# Patient Record
Sex: FEMALE | URBAN - METROPOLITAN AREA
[De-identification: names, ages, dates, MRNs, and addresses within clinical notes are randomized per-mention and may not be internally consistent; named-entity substitution may affect disease eponyms.]

---

## 2018-03-05 ENCOUNTER — APPOINTMENT (RX ONLY)
Dept: URBAN - METROPOLITAN AREA CLINIC 52 | Facility: CLINIC | Age: 73
Setting detail: DERMATOLOGY
End: 2018-03-05

## 2018-03-05 DIAGNOSIS — L82.1 OTHER SEBORRHEIC KERATOSIS: ICD-10-CM

## 2018-03-05 DIAGNOSIS — D22 MELANOCYTIC NEVI: ICD-10-CM

## 2018-03-05 DIAGNOSIS — L43.8 OTHER LICHEN PLANUS: ICD-10-CM

## 2018-03-05 DIAGNOSIS — L98.8 OTHER SPECIFIED DISORDERS OF THE SKIN AND SUBCUTANEOUS TISSUE: ICD-10-CM

## 2018-03-05 DIAGNOSIS — D18.0 HEMANGIOMA: ICD-10-CM

## 2018-03-05 DIAGNOSIS — L08.9 LOCAL INFECTION OF THE SKIN AND SUBCUTANEOUS TISSUE, UNSPECIFIED: ICD-10-CM

## 2018-03-05 DIAGNOSIS — L57.8 OTHER SKIN CHANGES DUE TO CHRONIC EXPOSURE TO NONIONIZING RADIATION: ICD-10-CM

## 2018-03-05 PROBLEM — E78.5 HYPERLIPIDEMIA, UNSPECIFIED: Status: ACTIVE | Noted: 2018-03-05

## 2018-03-05 PROBLEM — I10 ESSENTIAL (PRIMARY) HYPERTENSION: Status: ACTIVE | Noted: 2018-03-05

## 2018-03-05 PROBLEM — L70.0 ACNE VULGARIS: Status: ACTIVE | Noted: 2018-03-05

## 2018-03-05 PROBLEM — L40.0 PSORIASIS VULGARIS: Status: ACTIVE | Noted: 2018-03-05

## 2018-03-05 PROBLEM — D18.01 HEMANGIOMA OF SKIN AND SUBCUTANEOUS TISSUE: Status: ACTIVE | Noted: 2018-03-05

## 2018-03-05 PROBLEM — D22.5 MELANOCYTIC NEVI OF TRUNK: Status: ACTIVE | Noted: 2018-03-05

## 2018-03-05 PROCEDURE — ? TREATMENT REGIMEN

## 2018-03-05 PROCEDURE — ? COUNSELING

## 2018-03-05 PROCEDURE — 99203 OFFICE O/P NEW LOW 30 MIN: CPT

## 2018-03-05 PROCEDURE — ? ORDER TESTS

## 2018-03-05 PROCEDURE — ? PRESCRIPTION

## 2018-03-05 RX ORDER — MUPIROCIN 20 MG/G
OINTMENT TOPICAL
Qty: 1 | Refills: 0 | Status: ERX | COMMUNITY
Start: 2018-03-05

## 2018-03-05 RX ADMIN — MUPIROCIN: 20 OINTMENT TOPICAL at 00:00

## 2018-03-05 ASSESSMENT — LOCATION SIMPLE DESCRIPTION DERM
LOCATION SIMPLE: LEFT BUCCAL MUCOSA
LOCATION SIMPLE: LEFT UPPER BACK
LOCATION SIMPLE: RIGHT LIP
LOCATION SIMPLE: LEFT INFERIOR MUCOSAL LIP
LOCATION SIMPLE: RIGHT BUCCAL MUCOSA
LOCATION SIMPLE: LEFT FOREHEAD
LOCATION SIMPLE: LEFT LIP
LOCATION SIMPLE: ABDOMEN

## 2018-03-05 ASSESSMENT — LOCATION ZONE DERM
LOCATION ZONE: MUCOUS_MEMBRANE
LOCATION ZONE: FACE
LOCATION ZONE: TRUNK
LOCATION ZONE: LIP

## 2018-03-05 ASSESSMENT — LOCATION DETAILED DESCRIPTION DERM
LOCATION DETAILED: RIGHT BUCCAL MUCOSA
LOCATION DETAILED: LEFT MEDIAL FOREHEAD
LOCATION DETAILED: LEFT SUPERIOR UPPER BACK
LOCATION DETAILED: LEFT INFERIOR VERMILION LIP
LOCATION DETAILED: LEFT INFERIOR UPPER BACK
LOCATION DETAILED: LEFT BUCCAL MUCOSA
LOCATION DETAILED: LEFT INFERIOR MUCOSAL LIP
LOCATION DETAILED: SUBXIPHOID
LOCATION DETAILED: RIGHT INFERIOR VERMILION LIP

## 2018-03-05 NOTE — HPI: FREE FORM (FOLLOW UP HISTORY)
What Brings You In Today For Follow Up? (This Is An Xx Year Old Patient Who Is Following Up For:): History of Lichen Planus diagnosed approximately 1 year
Where On Your Body Is It? (Located On:): Oral
What Was It Treated With? (The Condition Was Treated With:): Triamcinolone cream\\nFirst prescribed by Shiras oral surgeon \\nThen refill by her local dentist

## 2018-03-05 NOTE — PROCEDURE: TREATMENT REGIMEN
Otc Regimen: Curcumin 500 mg 1 PO BID, lip balm for the lip
Detail Level: Zone
Continue Regimen: Triamcinolone Acetonide dental paste BID 10/5.  ( patient has medication) apply to lip and hold on with non-stick telfa/gauze
Plan: If condition becomes worse will refer to Dr. Jhonny Null

## 2018-03-05 NOTE — PROCEDURE: ORDER TESTS
Expected Date Of Service: 03/05/2018
Billing Type: [employer]
Bill For Surgical Tray: no
Performing Laboratory: [phone]

## 2018-03-27 ENCOUNTER — ESTABLISHED COMPREHENSIVE EXAM (OUTPATIENT)
Dept: URBAN - METROPOLITAN AREA CLINIC 36 | Facility: CLINIC | Age: 73
End: 2018-03-27

## 2018-03-27 DIAGNOSIS — H04.123: ICD-10-CM

## 2018-03-27 DIAGNOSIS — H26.491: ICD-10-CM

## 2018-03-27 DIAGNOSIS — G45.3: ICD-10-CM

## 2018-03-27 DIAGNOSIS — H35.371: ICD-10-CM

## 2018-03-27 PROCEDURE — 1036F TOBACCO NON-USER: CPT

## 2018-03-27 PROCEDURE — 92015 DETERMINE REFRACTIVE STATE: CPT

## 2018-03-27 PROCEDURE — G8427 DOCREV CUR MEDS BY ELIG CLIN: HCPCS

## 2018-03-27 PROCEDURE — 92014 COMPRE OPH EXAM EST PT 1/>: CPT

## 2018-03-27 ASSESSMENT — VISUAL ACUITY
OD_SC: >J10
OD_CC: J1
OS_CC: J1
OD_CC: 20/30
OS_SC: J1+
OU_CC: J1+
OS_SC: 20/80
OD_SC: 20/50-2
OS_CC: 20/30-2

## 2018-03-27 ASSESSMENT — TONOMETRY
OD_IOP_MMHG: 12
OS_IOP_MMHG: 10

## 2018-11-29 ENCOUNTER — ESTABLISHED PATIENT (OUTPATIENT)
Dept: URBAN - METROPOLITAN AREA CLINIC 36 | Facility: CLINIC | Age: 73
End: 2018-11-29

## 2018-11-29 DIAGNOSIS — H26.491: ICD-10-CM

## 2018-11-29 DIAGNOSIS — H35.371: ICD-10-CM

## 2018-11-29 PROCEDURE — 1036F TOBACCO NON-USER: CPT

## 2018-11-29 PROCEDURE — G8427 DOCREV CUR MEDS BY ELIG CLIN: HCPCS

## 2018-11-29 PROCEDURE — 92134 CPTRZ OPH DX IMG PST SGM RTA: CPT

## 2018-11-29 PROCEDURE — 92014 COMPRE OPH EXAM EST PT 1/>: CPT

## 2018-11-29 PROCEDURE — G9903 PT SCRN TBCO ID AS NON USER: HCPCS

## 2018-11-29 ASSESSMENT — VISUAL ACUITY
OS_CC: 20/20-2
OD_CC: 20/40

## 2018-11-29 ASSESSMENT — TONOMETRY
OD_IOP_MMHG: 16
OS_IOP_MMHG: 16

## 2018-12-14 ASSESSMENT — VISUAL ACUITY
OD_CC: 20/40
OS_CC: 20/20
OD_BAT: 20/50 W/ MR

## 2018-12-17 ENCOUNTER — CONSULT (OUTPATIENT)
Dept: URBAN - METROPOLITAN AREA CLINIC 39 | Facility: CLINIC | Age: 73
End: 2018-12-17

## 2018-12-17 ENCOUNTER — SURGERY/PROCEDURE (OUTPATIENT)
Dept: URBAN - METROPOLITAN AREA SURGERY 14 | Facility: SURGERY | Age: 73
End: 2018-12-17

## 2018-12-17 DIAGNOSIS — H26.491: ICD-10-CM

## 2018-12-17 PROCEDURE — 92014 COMPRE OPH EXAM EST PT 1/>: CPT

## 2018-12-17 PROCEDURE — 66821 AFTER CATARACT LASER SURGERY: CPT

## 2018-12-17 PROCEDURE — G9903 PT SCRN TBCO ID AS NON USER: HCPCS

## 2018-12-17 PROCEDURE — G8756 NO BP MEASURE DOC: HCPCS

## 2018-12-17 PROCEDURE — 1036F TOBACCO NON-USER: CPT

## 2018-12-17 PROCEDURE — G8427 DOCREV CUR MEDS BY ELIG CLIN: HCPCS

## 2018-12-17 ASSESSMENT — TONOMETRY
OS_IOP_MMHG: 16
OD_IOP_MMHG: 16

## 2018-12-20 ENCOUNTER — YAG POST-OP (OUTPATIENT)
Dept: URBAN - METROPOLITAN AREA CLINIC 36 | Facility: CLINIC | Age: 73
End: 2018-12-20

## 2018-12-20 DIAGNOSIS — Z98.890: ICD-10-CM

## 2018-12-20 PROCEDURE — 99024 POSTOP FOLLOW-UP VISIT: CPT

## 2018-12-20 ASSESSMENT — VISUAL ACUITY
OS_SC: J1
OD_SC: <J10
OD_SC: 20/50+2
OS_CC: 20/25-2
OD_CC: J1
OS_CC: J2
OS_SC: 20/70-1
OD_CC: 20/30+2

## 2018-12-20 ASSESSMENT — TONOMETRY
OS_IOP_MMHG: 16
OD_IOP_MMHG: 16

## 2019-12-04 ENCOUNTER — ESTABLISHED COMPREHENSIVE EXAM (OUTPATIENT)
Dept: URBAN - METROPOLITAN AREA CLINIC 36 | Facility: CLINIC | Age: 74
End: 2019-12-04

## 2019-12-04 DIAGNOSIS — G43.B0: ICD-10-CM

## 2019-12-04 DIAGNOSIS — H35.371: ICD-10-CM

## 2019-12-04 DIAGNOSIS — H04.123: ICD-10-CM

## 2019-12-04 PROCEDURE — 92134 CPTRZ OPH DX IMG PST SGM RTA: CPT

## 2019-12-04 PROCEDURE — 92015 DETERMINE REFRACTIVE STATE: CPT

## 2019-12-04 PROCEDURE — 92014 COMPRE OPH EXAM EST PT 1/>: CPT

## 2019-12-04 ASSESSMENT — VISUAL ACUITY
OD_CC: 20/25
OD_SC: J10
OS_SC: J1+
OD_CC: J1+
OD_SC: 20/50
OS_SC: 20/70-2
OS_CC: 20/30
OS_CC: J1

## 2019-12-04 ASSESSMENT — TONOMETRY
OD_IOP_MMHG: 15
OS_IOP_MMHG: 16

## 2021-03-18 NOTE — PATIENT DISCUSSION
Patient advised of the right to post-operative care by the surgeon. Patient is fully informed of, and agreed to, co-management with their primary optometric physician. Post-operative care by the surgeon is not medically necessary and co-management is clinically appropriate. Patient has received itemization of fees related to cataract surgery. Transfer of care letter completed for the patient. Transfer care of right eye to Dr. Robert Maxwell on 3.19.2021. Patient instructed to call immediately if any new distortion, blurring, decreased vision or eye pain.

## 2021-03-25 NOTE — PATIENT DISCUSSION
Patient advised of the right to post-operative care by the surgeon. Patient is fully informed of, and agreed to, co-management with their primary optometric physician. Post-operative care by the surgeon is not medically necessary and co-management is clinically appropriate. Patient has received itemization of fees related to cataract surgery. Transfer of care letter completed for the patient. Transfer care of left eye to Dr. Farley Mail on 3.26.2021. Patient instructed to call immediately if any new distortion, blurring, decreased vision or eye pain.

## 2021-03-25 NOTE — PATIENT DISCUSSION
Patient advised of the right to post-operative care by the surgeon. Patient is fully informed of, and agreed to, co-management with their primary optometric physician. Post-operative care by the surgeon is not medically necessary and co-management is clinically appropriate. Patient has received itemization of fees related to cataract surgery. Transfer of care letter completed for the patient. Transfer care of left eye to Dr. Charlotte Holbrook on 3.26.2021. Patient instructed to call immediately if any new distortion, blurring, decreased vision or eye pain.

## 2021-03-25 NOTE — PATIENT DISCUSSION
Cataract surgery has been performed in the first eye and activities of daily living are still impaired. The patient would like to proceed with cataract surgery in the second eye as scheduled. The patient elects BASIC OS, goal of VITA.

## 2021-03-26 ENCOUNTER — ESTABLISHED COMPREHENSIVE EXAM (OUTPATIENT)
Dept: URBAN - METROPOLITAN AREA CLINIC 36 | Facility: CLINIC | Age: 76
End: 2021-03-26

## 2021-03-26 DIAGNOSIS — H35.371: ICD-10-CM

## 2021-03-26 DIAGNOSIS — G43.B0: ICD-10-CM

## 2021-03-26 DIAGNOSIS — H04.123: ICD-10-CM

## 2021-03-26 PROCEDURE — 92014 COMPRE OPH EXAM EST PT 1/>: CPT

## 2021-03-26 PROCEDURE — 92134 CPTRZ OPH DX IMG PST SGM RTA: CPT

## 2021-03-26 ASSESSMENT — VISUAL ACUITY
OS_CC: J1+
OS_SC: J3
OD_SC: 20/60-1
OD_CC: 20/30-2
OD_CC: J3
OS_SC: 20/70+2
OS_CC: 20/25
OD_SC: >J10

## 2021-03-26 ASSESSMENT — TONOMETRY
OS_IOP_MMHG: 16
OD_IOP_MMHG: 16

## 2022-02-21 NOTE — PATIENT DISCUSSION
Patient advised of the right to post-operative care by the surgeon. Patient is fully informed of, and agreed to, co-management with their primary optometric physician. Post-operative care by the surgeon is not medically necessary and co-management is clinically appropriate. Patient has received itemization of fees related to cataract surgery. Transfer of care letter completed for the patient. Transfer care of left eye to Dr. Jomar Flores on 3.26.2021. Patient instructed to call immediately if any new distortion, blurring, decreased vision or eye pain.

## 2022-03-25 ENCOUNTER — ESTABLISHED PATIENT (OUTPATIENT)
Dept: URBAN - METROPOLITAN AREA CLINIC 36 | Facility: CLINIC | Age: 77
End: 2022-03-25

## 2022-03-25 DIAGNOSIS — G43.B0: ICD-10-CM

## 2022-03-25 DIAGNOSIS — H02.831: ICD-10-CM

## 2022-03-25 DIAGNOSIS — H02.834: ICD-10-CM

## 2022-03-25 DIAGNOSIS — H35.371: ICD-10-CM

## 2022-03-25 PROCEDURE — 92014 COMPRE OPH EXAM EST PT 1/>: CPT

## 2022-03-25 ASSESSMENT — VISUAL ACUITY
OD_PH: 20/50+2
OD_SC: 20/40-1
OS_CC: J1+
OD_CC: 20/50
OS_SC: J2
OS_CC: 20/25
OD_SC: J10
OS_SC: 20/60-1
OD_CC: J1+

## 2022-03-25 ASSESSMENT — TONOMETRY
OD_IOP_MMHG: 18
OS_IOP_MMHG: 20

## 2023-01-11 ENCOUNTER — FOLLOW UP (OUTPATIENT)
Dept: URBAN - METROPOLITAN AREA CLINIC 36 | Facility: CLINIC | Age: 78
End: 2023-01-11

## 2023-01-11 DIAGNOSIS — H10.45: ICD-10-CM

## 2023-01-11 DIAGNOSIS — H04.223: ICD-10-CM

## 2023-01-11 DIAGNOSIS — H04.123: ICD-10-CM

## 2023-01-11 PROCEDURE — 99213 OFFICE O/P EST LOW 20 MIN: CPT

## 2023-01-11 ASSESSMENT — VISUAL ACUITY
OS_CC: 20/20-1
OD_CC: 20/25-1

## 2023-01-11 ASSESSMENT — TONOMETRY
OS_IOP_MMHG: 16
OD_IOP_MMHG: 16

## 2023-01-30 ENCOUNTER — ESTABLISHED PATIENT (OUTPATIENT)
Dept: URBAN - METROPOLITAN AREA CLINIC 44 | Facility: CLINIC | Age: 78
End: 2023-01-30

## 2023-01-30 DIAGNOSIS — H02.832: ICD-10-CM

## 2023-01-30 DIAGNOSIS — H04.223: ICD-10-CM

## 2023-01-30 DIAGNOSIS — H02.834: ICD-10-CM

## 2023-01-30 DIAGNOSIS — H04.553: ICD-10-CM

## 2023-01-30 DIAGNOSIS — H10.45: ICD-10-CM

## 2023-01-30 DIAGNOSIS — H02.835: ICD-10-CM

## 2023-01-30 DIAGNOSIS — H02.831: ICD-10-CM

## 2023-01-30 DIAGNOSIS — H04.123: ICD-10-CM

## 2023-01-30 PROCEDURE — 92285 EXTERNAL OCULAR PHOTOGRAPHY: CPT

## 2023-01-30 PROCEDURE — 99213 OFFICE O/P EST LOW 20 MIN: CPT

## 2023-01-30 PROCEDURE — 68840 EXPLORE/IRRIGATE TEAR DUCTS: CPT

## 2023-01-30 ASSESSMENT — VISUAL ACUITY
OD_CC: 20/25
OS_CC: 20/20

## 2023-04-18 ENCOUNTER — POST-OP (OUTPATIENT)
Dept: URBAN - METROPOLITAN AREA CLINIC 44 | Facility: CLINIC | Age: 78
End: 2023-04-18

## 2023-04-18 DIAGNOSIS — H10.45: ICD-10-CM

## 2023-04-18 DIAGNOSIS — H04.223: ICD-10-CM

## 2023-04-18 DIAGNOSIS — Z98.890: ICD-10-CM

## 2023-04-18 DIAGNOSIS — H02.834: ICD-10-CM

## 2023-04-18 DIAGNOSIS — H02.832: ICD-10-CM

## 2023-04-18 DIAGNOSIS — H02.831: ICD-10-CM

## 2023-04-18 DIAGNOSIS — H35.371: ICD-10-CM

## 2023-04-18 DIAGNOSIS — H02.835: ICD-10-CM

## 2023-04-18 DIAGNOSIS — H04.123: ICD-10-CM

## 2023-04-18 PROCEDURE — 99212 OFFICE O/P EST SF 10 MIN: CPT

## 2023-04-18 ASSESSMENT — VISUAL ACUITY
OS_SC: 20/70
OD_SC: 20/30
OS_PH: 20/40

## 2023-04-27 ENCOUNTER — CONSULTATION/EVALUATION (OUTPATIENT)
Dept: URBAN - METROPOLITAN AREA CLINIC 44 | Facility: CLINIC | Age: 78
End: 2023-04-27

## 2023-04-27 DIAGNOSIS — L98.8: ICD-10-CM

## 2023-04-27 DIAGNOSIS — H02.834: ICD-10-CM

## 2023-04-27 DIAGNOSIS — H02.832: ICD-10-CM

## 2023-04-27 DIAGNOSIS — H02.835: ICD-10-CM

## 2023-04-27 DIAGNOSIS — H02.831: ICD-10-CM

## 2023-04-27 DIAGNOSIS — H57.813: ICD-10-CM

## 2023-04-27 PROCEDURE — 99499NC CONSULT, COSMETIC NO CHARGE

## 2023-04-27 ASSESSMENT — VISUAL ACUITY
OS_SC: 20/25
OD_SC: 20/20

## 2024-03-26 ENCOUNTER — ESTABLISHED PATIENT (OUTPATIENT)
Dept: URBAN - METROPOLITAN AREA CLINIC 36 | Facility: CLINIC | Age: 79
End: 2024-03-26

## 2024-03-26 DIAGNOSIS — H35.371: ICD-10-CM

## 2024-03-26 DIAGNOSIS — H04.123: ICD-10-CM

## 2024-03-26 PROCEDURE — 92134 CPTRZ OPH DX IMG PST SGM RTA: CPT

## 2024-03-26 PROCEDURE — 99214 OFFICE O/P EST MOD 30 MIN: CPT

## 2024-03-26 ASSESSMENT — VISUAL ACUITY
OD_CC: 20/30-2
OD_CC: J1
OS_CC: 20/20-1
OD_SC: J10
OS_SC: 20/30-2
OD_SC: 20/30-2
OS_SC: J2
OS_CC: J1

## 2024-03-26 ASSESSMENT — TONOMETRY
OD_IOP_MMHG: 16
OS_IOP_MMHG: 17

## 2025-03-20 ENCOUNTER — COMPREHENSIVE EXAM (OUTPATIENT)
Age: 80
End: 2025-03-20

## 2025-03-20 DIAGNOSIS — H04.123: ICD-10-CM

## 2025-03-20 DIAGNOSIS — H02.835: ICD-10-CM

## 2025-03-20 DIAGNOSIS — H02.831: ICD-10-CM

## 2025-03-20 DIAGNOSIS — H10.45: ICD-10-CM

## 2025-03-20 DIAGNOSIS — H35.371: ICD-10-CM

## 2025-03-20 DIAGNOSIS — H02.834: ICD-10-CM

## 2025-03-20 DIAGNOSIS — H02.832: ICD-10-CM

## 2025-03-20 DIAGNOSIS — G43.B0: ICD-10-CM

## 2025-03-20 PROCEDURE — 99213 OFFICE O/P EST LOW 20 MIN: CPT
